# Patient Record
(demographics unavailable — no encounter records)

---

## 2024-11-04 NOTE — REVIEW OF SYSTEMS
[Nl] : Endocrine [Immunizations are up to date] : Immunizations are up to date [de-identified] : see HPI [Influenza Vaccine this Past Year] : no influenza vaccine this past year

## 2024-11-04 NOTE — REVIEW OF SYSTEMS
[Nl] : Endocrine [Immunizations are up to date] : Immunizations are up to date [de-identified] : see HPI [Influenza Vaccine this Past Year] : no influenza vaccine this past year

## 2024-11-04 NOTE — ASSESSMENT
[FreeTextEntry1] : IRMA WINSTON is a 10 year M with previously diagnosed asthma presenting for evaluation. No FH of asthma however noted allergy history including food allergies, environmental allergies, and concerns for drug allergy (to Amoxicillin though at this point unfounded). Currently on Fluticasone Propionate  mcg/ACT 1 puff twice daily with spacer and currently doing well. No recurrent cough or wheeze noted except with illness. Lungs are clear on exam and spirometry performed is normal without obstructive defect. At this time, propose continued use of his inhaled corticosteroid through the remainder of the winter/viral season with consideration of weaning off inhaled corticosteroid come the spring. Family is on board with this plan. History, exam, trajectory or course are not supportive of alternative diagnoses such as CF, primary ciliary dyskinesia, immune deficiency, or congenital airway anomalies.  I have reviewed the asthma care plan and discussed it in detail with the family. I have discussed the pathophysiology of asthma, management strategies namely the roles of asthma medications and identified them by name (including long term control/preventative medications and quick relief medications that relieve symptoms), and goals of care. I have discussed safety and efficacy of inhaled ICS. I have discussed and reviewed the rationale for and importance of adherence to long term control medication to prevent symptoms and control asthma. Additionally, I discussed signs of respiratory distress and when to seek medical attention. Lastly, proper MDI chamber/mask administration reviewed.    Based on the above assessment, my recommendations are as follows: 1. Take 1 puffs of Fluticasone Propionate  mcg/ACT 2 times a day using your spacer +/- mask. Rinse mouth out afterwards. 2. With respiratory infections, increase his inhaled corticosteroid up to 2 puffs twice per day for upwards of 10-14 days or stop sooner if symptoms resolve. 3. Take 2-4 puffs of albuterol every 4-6 hours via a spacer +/- mask as needed for cough, wheezing, or shortness of breath. 4. With respiratory infections, start albuterol 2-3x per day and increase as per above. 5. Follow up in March 2025 to discuss next steps, if not sooner as needed.  Discussed above assessment, management plan, and test results. Parent agreed with plan. All queries were answered.

## 2024-11-04 NOTE — REVIEW OF SYSTEMS
[Nl] : Endocrine [Immunizations are up to date] : Immunizations are up to date [de-identified] : see HPI [Influenza Vaccine this Past Year] : no influenza vaccine this past year

## 2024-11-04 NOTE — HISTORY OF PRESENT ILLNESS
[FreeTextEntry1] : IRMA WINSTON is a 10 year M presenting for evaluation of asthma. This past February he had hMPV and was seen in the ER several times that month for wheezing. Had already been on Flovent prior to that but had been weaned off prior to the illness. He was restarted on it and then PCP recommended pulmonary referral. CXR in the ER was reportedly normal.  Current medications: Flovent 110 mcg/ACT 1p BID with occasional missed doses. Good spacer technique. Has been on Flovent for the last year. Albuterol use: only with colds, last time used in the spring during allergy season. Allergy response: yes, to 4 puffs History of wheezing: yes FH asthma: denies Eczema: denies Allergies: tree nuts, sesame seeds, Amoxicillin; outgrew dairy allergy. +pollen allergy ER visits: 3-4x this past year, once before this year Hospitalizations: denies Oral corticosteroids: At least twice this past year during the ER visits in February   Respiratory Symptoms Chronic/Persistent daytime cough: denies when well  Chronic/Persistent nighttime cough: denies when well Activity limitation: denies   Respiratory morbidity History of pneumonia: Walking pneumonia/clinical pneumonia History of sinusitis: denies History of recurrent ear infections: yes, but no TT Family history of CF, PCD, or other diseases of childhood: denies   Other comorbidities FRANCISCO Symptoms: No history of snoring, pauses in breathing, apneic events, early-morning headaches, or excessive daytime fatigue. GERD: No history of spitting up, regurgitation of feeds, back arching, chest discomfort with feeds. No history of medical treatment for reflux. Dysphagia: No history of choking or gagging with feeds.   Birth history: FT, no resp issues after birth, no NICU Smokers in household: denies Grade: 5th grade Immunizations: UTD, no flu shot this year

## 2025-03-01 NOTE — PHYSICAL EXAM
[Well Nourished] : well nourished [Well Developed] : well developed [Alert] : ~L alert [Active] : active [Normal Breathing Pattern] : normal breathing pattern [No Respiratory Distress] : no respiratory distress [No Allergic Shiners] : no allergic shiners [No Drainage] : no drainage [No Conjunctivitis] : no conjunctivitis [Tympanic Membranes Clear] : tympanic membranes were clear [Nasal Mucosa Non-Edematous] : nasal mucosa non-edematous [No Nasal Drainage] : no nasal drainage [No Polyps] : no polyps [No Sinus Tenderness] : no sinus tenderness [No Oral Pallor] : no oral pallor [Non-Erythematous] : non-erythematous [No Oral Cyanosis] : no oral cyanosis [No Exudates] : no exudates [No Postnasal Drip] : no postnasal drip [No Tonsillar Enlargement] : no tonsillar enlargement [Absence Of Retractions] : absence of retractions [Symmetric] : symmetric [Good Expansion] : good expansion [No Acc Muscle Use] : no accessory muscle use [Good aeration to bases] : good aeration to bases [Equal Breath Sounds] : equal breath sounds bilaterally [No Crackles] : no crackles [No Rhonchi] : no rhonchi [No Wheezing] : no wheezing [Normal Sinus Rhythm] : normal sinus rhythm [No Heart Murmur] : no heart murmur [Soft, Non-Tender] : soft, non-tender [No Hepatosplenomegaly] : no hepatosplenomegaly [Non Distended] : was not ~L distended [Abdomen Mass (___ Cm)] : no abdominal mass palpated [Full ROM] : full range of motion [No Clubbing] : no clubbing [Capillary Refill < 2 secs] : capillary refill less than two seconds [No Cyanosis] : no cyanosis [No Petechiae] : no petechiae [No Kyphoscoliosis] : no kyphoscoliosis [No Contractures] : no contractures [Alert and  Oriented] : alert and oriented [No Abnormal Focal Findings] : no abnormal focal findings [Normal Muscle Tone And Reflexes] : normal muscle tone and reflexes [No Birth Marks] : no birth marks [No Rashes] : no rashes [No Skin Lesions] : no skin lesions

## 2025-03-03 NOTE — HISTORY OF PRESENT ILLNESS
[FreeTextEntry1] : 3/2025 Interval History: Recent ER visits/hospitalizations: Last oral steroid course: Recurrent cough or wheeze: Recurrent nocturnal cough or wheeze: Activity-induced symptoms: Daily meds: Last used rescue: Allergic symptoms: Allergy medications Flu vaccine: COVID 19 vaccine: Snoring: Reflux, dysphagia, aspiration:  11/2024 - initial visit IRMA WINSTON is a 10 year M presenting for evaluation of asthma. This past February he had hMPV and was seen in the ER several times that month for wheezing. Had already been on Flovent prior to that but had been weaned off prior to the illness. He was restarted on it and then PCP recommended pulmonary referral. CXR in the ER was reportedly normal.  Current medications: Flovent 110 mcg/ACT 1p BID with occasional missed doses. Good spacer technique. Has been on Flovent for the last year. Albuterol use: only with colds, last time used in the spring during allergy season. Allergy response: yes, to 4 puffs History of wheezing: yes FH asthma: denies Eczema: denies Allergies: tree nuts, sesame seeds, Amoxicillin; outgrew dairy allergy. +pollen allergy ER visits: 3-4x this past year, once before this year Hospitalizations: denies Oral corticosteroids: At least twice this past year during the ER visits in February   Respiratory Symptoms Chronic/Persistent daytime cough: denies when well  Chronic/Persistent nighttime cough: denies when well Activity limitation: denies   Respiratory morbidity History of pneumonia: Walking pneumonia/clinical pneumonia History of sinusitis: denies History of recurrent ear infections: yes, but no TT Family history of CF, PCD, or other diseases of childhood: denies   Other comorbidities FRANCISCO Symptoms: No history of snoring, pauses in breathing, apneic events, early-morning headaches, or excessive daytime fatigue. GERD: No history of spitting up, regurgitation of feeds, back arching, chest discomfort with feeds. No history of medical treatment for reflux. Dysphagia: No history of choking or gagging with feeds.   Birth history: FT, no resp issues after birth, no NICU Smokers in household: denies Grade: 5th grade Immunizations: UTD, no flu shot this year

## 2025-03-03 NOTE — REVIEW OF SYSTEMS
[Nl] : Endocrine [Immunizations are up to date] : Immunizations are up to date [de-identified] : see HPI [Influenza Vaccine this Past Year] : no influenza vaccine this past year